# Patient Record
Sex: FEMALE | Race: WHITE
[De-identification: names, ages, dates, MRNs, and addresses within clinical notes are randomized per-mention and may not be internally consistent; named-entity substitution may affect disease eponyms.]

---

## 2017-02-02 ENCOUNTER — HOSPITAL ENCOUNTER (EMERGENCY)
Dept: HOSPITAL 17 - NETRI | Age: 22
Discharge: HOME | End: 2017-02-02
Payer: MEDICARE

## 2017-02-02 VITALS
SYSTOLIC BLOOD PRESSURE: 136 MMHG | DIASTOLIC BLOOD PRESSURE: 69 MMHG | TEMPERATURE: 98.2 F | OXYGEN SATURATION: 99 % | HEART RATE: 86 BPM | RESPIRATION RATE: 16 BRPM

## 2017-02-02 VITALS — HEIGHT: 62 IN | BODY MASS INDEX: 30.43 KG/M2 | WEIGHT: 165.35 LBS

## 2017-02-02 DIAGNOSIS — Z32.02: ICD-10-CM

## 2017-02-02 DIAGNOSIS — N91.2: Primary | ICD-10-CM

## 2017-02-02 LAB
BACTERIA #/AREA URNS HPF: (no result) /HPF
BETA HCG QUANT: (no result) MIU/ML (ref 0–5)
COLOR UR: YELLOW
COMMENT (UR): (no result)
CULTURE IF INDICATED: (no result)
GLUCOSE UR STRIP-MCNC: (no result) MG/DL
HGB UR QL STRIP: (no result)
KETONES UR STRIP-MCNC: (no result) MG/DL
MUCOUS THREADS #/AREA URNS LPF: (no result) /LPF
NITRITE UR QL STRIP: (no result)
SP GR UR STRIP: 1.02 (ref 1–1.03)
SQUAMOUS #/AREA URNS HPF: 2 /HPF (ref 0–5)

## 2017-02-02 PROCEDURE — 99283 EMERGENCY DEPT VISIT LOW MDM: CPT

## 2017-02-02 PROCEDURE — 81001 URINALYSIS AUTO W/SCOPE: CPT

## 2017-02-02 PROCEDURE — 84703 CHORIONIC GONADOTROPIN ASSAY: CPT

## 2017-02-02 PROCEDURE — 84702 CHORIONIC GONADOTROPIN TEST: CPT

## 2017-02-02 NOTE — PD
HPI


Chief Complaint:  Gyn Problem/Complaint


Time Seen by Provider:  13:15


Travel History


International Travel<30 days:  No


Contact w/Intl Traveler<30days:  No


Traveled to known affect area:  No





History of Present Illness


HPI


21-year-old  female presents to the ED for evaluation of a 2 month history 

of amenorrhea.  She states that she has taken multiple urine pregnancy test 

which are negative but she is concerned that she may be pregnant.  She endorses 

2 days of light brown spotting last month.  She endorses intermittent abdominal 

discomfort.  No alleviating or exacerbating factors reported.  She states that 

prior to these episodes her periods lasted 7 days, heavier flow the first 3 

days tapering off at the end.  She denies fever, chills, anorexia, nausea, 

vomiting, changes in bowel habits, dysuria, back pain, vaginal discharge, 

vaginal odor.  She endorses unprotected sex with a single male partner.  She 

does not use oral contraception.





PFSH


Past Medical History


Diminished Hearing:  No


Pregnant?:  Unknown


LMP:  2016


:  2


Para:  0


Miscarriage:  1





Social History


Alcohol Use:  No


Tobacco Use:  No


Substance Use:  Yes (MARIJUANA BEFORE PREGNANCY)





Allergies-Medications


(Allergen,Severity, Reaction):  


Coded Allergies:  


     No Known Allergies (Unverified , 1/9/15)


Reported Meds & Prescriptions





Reported Meds & Active Scripts


Active


Keflex (Cephalexin) 500 Mg Cap 500 Mg PO Q6H 7 Days


Z.0.jasmin-Colace 8.61 1 Tab Tab 1 Tab PO DAILY PRN


Z.0.mominuzws252 Mg 800 Mg Tab 800 Mg PO Q8 PRN


Reported


Prenatal Vitamins   1 Tab PO DAILY








Review of Systems


Except as stated in HPI:  all other systems reviewed are Neg





Physical Exam


Narrative


GENERAL: Well-nourished, well-developed nontoxic appearing white female in no 

acute distress..


SKIN: Warm and dry.


HEAD: Normocephalic.


EYES: No scleral icterus. No injection or drainage. 


NECK: Supple, trachea midline. No JVD or lymphadenopathy.


CARDIOVASCULAR: Regular rate and rhythm without murmurs, gallops, or rubs.  2+ 

DP and radial pulses bilaterally.


RESPIRATORY: Breath sounds clear and equal bilaterally. No accessory muscle use.


GASTROINTESTINAL: Abdomen soft, non-tender, nondistended.  No suprapubic 

tenderness.  Active bowel sounds.


MUSCULOSKELETAL: No cyanosis, or edema.  The patient is ambulatory.  She moves 

easily from standing to sitting positions.


BACK: Nontender without obvious deformity. No CVA tenderness.





Data


Data


Last Documented VS





Vital Signs








  Date Time  Temp Pulse Resp B/P Pulse Ox O2 Delivery O2 Flow Rate FiO2


 


17 11:23 98.2 86 16 136/69 99 Room Air  








Orders





 Ed Urine Pregnancytest Poc (17 11:35)


Urinalysis - C+S If Indicated (17 13:23)


Beta Hcg (Quant/Titer) (17 13:23)





Labs





 Laboratory Tests








Test 17





 11:45 13:30


 


Urine Color YELLOW  


 


Urine Turbidity CLEAR  


 


Urine pH 6.5  


 


Urine Specific Gravity 1.024  


 


Urine Protein NEG mg/dL 


 


Urine Glucose (UA) NEG mg/dL 


 


Urine Ketones NEG mg/dL 


 


Urine Occult Blood NEG  


 


Urine Nitrite NEG  


 


Urine Bilirubin NEG  


 


Urine Urobilinogen LESS THAN 2.0 





 MG/DL 


 


Urine Leukocyte Esterase NEG  


 


Urine RBC 2 /hpf 


 


Urine WBC 1 /hpf 


 


Urine Squamous Epithelial 2 /hpf 





Cells  


 


Urine Bacteria RARE /hpf 


 


Urine Mucus FEW /lpf 


 


Microscopic Urinalysis Comment CULT NOT 





 INDICATED 


 


Human Chorionic Gonadotropin,  LESS THAN 1





Quant  MIU/ML











MDM


Medical Decision Making


Medical Screen Exam Complete:  Yes


Emergency Medical Condition:  Yes


Differential Diagnosis


Dysfunctional uterine bleeding versus amenorrhea versus pregnancy versus 

metromenorrhagia versus other


Narrative Course


21-year-old  female presents to the ED for evaluation of 2 months history 

of absent menses.  She endorses multiple negative urine pregnancy tests but is 

still concerned that she is pregnant.  Endorses 2 days of light brown spotting 

last month.  Endorses intermittent abdominal discomfort, no alleviating or 

exacerbating factors reported.  Denies fever, chills, anorexia, nausea, vomiting

, changes in bowel habit, dysuria, back pain, vaginal discharge, vaginal odor.  

Endorses unprotected sex with a single male partner.  Vitals reviewed.  

Physical exam reveals a nontoxic-appearing white female in no acute distress.  

Abdominal exam is completely benign.  Urine pregnancy test negative.  No 

indication for culture of the UA.  Beta Quant hCG less than 1.  I discussed the 

results of blood workup with the patient.  She was reassured but the blood work 

that she is not pregnant.  I stressed the importance of follow-up with the 

gynecologist for further evaluation of her absent periods.  Instructed her to 

establish care within an OB as soon as possible.  We discussed reasons to 

return to the ED.  She indicated understanding of the instructions.  She is 

amenable to plan of care.  She is stable and discharged home.





Diagnosis





 Primary Impression:  


 Amenorrhea, unspecified


Referrals:  


Gynecologist


Patient Instructions:  Amenorrhea (GEN), General Instructions





***Additional Instructions:


Rest, hydrate.


Use barrier birth control or oral contraceptive methods.


Follow-up with the gynecologist for full evaluation of your absent periods.


Return to the ED for any urgent or emergent medical condition.


Disposition:  01 DISCHARGE HOME


Condition:  Stable








Delisa Staples 2017 13:16

## 2017-10-03 ENCOUNTER — HOSPITAL ENCOUNTER (EMERGENCY)
Dept: HOSPITAL 17 - NEPD | Age: 22
LOS: 1 days | Discharge: HOME | End: 2017-10-04
Payer: MEDICARE

## 2017-10-03 VITALS
RESPIRATION RATE: 16 BRPM | OXYGEN SATURATION: 100 % | SYSTOLIC BLOOD PRESSURE: 130 MMHG | DIASTOLIC BLOOD PRESSURE: 73 MMHG | HEART RATE: 101 BPM | TEMPERATURE: 98.7 F

## 2017-10-03 VITALS — WEIGHT: 176.37 LBS | BODY MASS INDEX: 32.46 KG/M2 | HEIGHT: 62 IN

## 2017-10-03 DIAGNOSIS — R10.11: ICD-10-CM

## 2017-10-03 DIAGNOSIS — O26.892: Primary | ICD-10-CM

## 2017-10-03 DIAGNOSIS — Z3A.15: ICD-10-CM

## 2017-10-03 PROCEDURE — 83690 ASSAY OF LIPASE: CPT

## 2017-10-03 PROCEDURE — 99285 EMERGENCY DEPT VISIT HI MDM: CPT

## 2017-10-03 PROCEDURE — 76705 ECHO EXAM OF ABDOMEN: CPT

## 2017-10-03 PROCEDURE — 76775 US EXAM ABDO BACK WALL LIM: CPT

## 2017-10-03 PROCEDURE — 85730 THROMBOPLASTIN TIME PARTIAL: CPT

## 2017-10-03 PROCEDURE — 85025 COMPLETE CBC W/AUTO DIFF WBC: CPT

## 2017-10-03 PROCEDURE — 81001 URINALYSIS AUTO W/SCOPE: CPT

## 2017-10-03 PROCEDURE — 80053 COMPREHEN METABOLIC PANEL: CPT

## 2017-10-03 PROCEDURE — 85610 PROTHROMBIN TIME: CPT

## 2017-10-04 LAB
ALP SERPL-CCNC: 56 U/L (ref 45–117)
ALT SERPL-CCNC: 12 U/L (ref 10–53)
ANION GAP SERPL CALC-SCNC: 8 MEQ/L (ref 5–15)
APTT BLD: 28.9 SEC (ref 24.3–30.1)
AST SERPL-CCNC: 8 U/L (ref 15–37)
BASOPHILS # BLD AUTO: 0 TH/MM3 (ref 0–0.2)
BASOPHILS NFR BLD: 0.5 % (ref 0–2)
BILIRUB SERPL-MCNC: 0.2 MG/DL (ref 0.2–1)
BUN SERPL-MCNC: 8 MG/DL (ref 7–18)
CHLORIDE SERPL-SCNC: 105 MEQ/L (ref 98–107)
COLOR UR: (no result)
COMMENT (UR): (no result)
CULTURE IF INDICATED: (no result)
EOSINOPHIL # BLD: 0.1 TH/MM3 (ref 0–0.4)
EOSINOPHIL NFR BLD: 1.3 % (ref 0–4)
ERYTHROCYTE [DISTWIDTH] IN BLOOD BY AUTOMATED COUNT: 13.4 % (ref 11.6–17.2)
GFR SERPLBLD BASED ON 1.73 SQ M-ARVRAT: 162 ML/MIN (ref 89–?)
GLUCOSE UR STRIP-MCNC: (no result) MG/DL
HCO3 BLD-SCNC: 25 MEQ/L (ref 21–32)
HCT VFR BLD CALC: 32.9 % (ref 35–46)
HEMO FLAGS: (no result)
HGB UR QL STRIP: (no result)
INR PPP: 1 RATIO
KETONES UR STRIP-MCNC: (no result) MG/DL
LYMPHOCYTES # BLD AUTO: 2.5 TH/MM3 (ref 1–4.8)
LYMPHOCYTES NFR BLD AUTO: 28.2 % (ref 9–44)
MCH RBC QN AUTO: 29.2 PG (ref 27–34)
MCHC RBC AUTO-ENTMCNC: 34.4 % (ref 32–36)
MCV RBC AUTO: 84.9 FL (ref 80–100)
MONOCYTES NFR BLD: 6.2 % (ref 0–8)
NEUTROPHILS # BLD AUTO: 5.6 TH/MM3 (ref 1.8–7.7)
NEUTROPHILS NFR BLD AUTO: 63.8 % (ref 16–70)
NITRITE UR QL STRIP: (no result)
PLATELET # BLD: 169 TH/MM3 (ref 150–450)
POTASSIUM SERPL-SCNC: 3.5 MEQ/L (ref 3.5–5.1)
PROTHROMBIN TIME: 10.7 SEC (ref 9.8–11.6)
RBC # BLD AUTO: 3.87 MIL/MM3 (ref 4–5.3)
SODIUM SERPL-SCNC: 138 MEQ/L (ref 136–145)
SP GR UR STRIP: 1.01 (ref 1–1.03)
SQUAMOUS #/AREA URNS HPF: 1 /HPF (ref 0–5)
WBC # BLD AUTO: 8.8 TH/MM3 (ref 4–11)

## 2017-10-04 NOTE — RADRPT
EXAM DATE/TIME:  10/04/2017 00:41 

 

[facility] COMPARISON:     

US KIDNEY/RENAL/BLADDER, October 04, 2017, 0:48.  CT ABDOMEN & PELVIS W/O CONTRAST, November 04, 2016
, 18:33.  US PELVIS ([facility] PREG/ECTOPIC) W/TRANSVAG, July 12, 2014, 4:53.

        

 

 

INDICATIONS :     

Right upper quadrant pain.

                     

 

MEDICAL HISTORY :     

Pregnancy.     Right upper quadrant pain.

 

SURGICAL HISTORY :     

None.     

 

ENCOUNTER:     

Initial

 

ACUITY:     

1 day

 

PAIN SCORE:     

3/10

 

LOCATION:     

Right upper quadrant 

                     

MEASUREMENTS:     

 

LIVER:     

16.1 cm length 

 

COMMON DUCT:     

3 mm

 

RIGHT KIDNEY:     

10.9 x 5.2 x 6.9 cm

 

FINDINGS:     

 

LIVER:     

Normal echotexture without focal lesion or ductal dilatation.  

 

COMMON DUCT:     

No intraluminal mass or stone visualized.  

 

GALLBLADDER:          

Contains no stones, demonstrates no wall thickening or pericholecystic fluid.  

 

PANCREAS:          

The visualized portions are within normal limits.  

 

RIGHT KIDNEY:          

Mild hydronephrosis.

 

CONCLUSION:     

Mild hydronephrosis of the right kidney. Normal ultrasound appearance of the gallbladder and other ri
ght upper quadrant structures.

 

 

 

 Christos Miller MD on October 04, 2017 at 1:36           

Board Certified Radiologist.

 This report was verified electronically.

## 2017-10-04 NOTE — RADRPT
EXAM DATE/TIME:  10/04/2017 00:48 

 

[facility] COMPARISON:     

US ABDOMEN - GALLBLADDER, October 04, 2017, 0:41.  CT ABDOMEN & PELVIS W/O CONTRAST, November 04, 201
6, 18:33.  US PELVIS ([facility] PREG/ECTOPIC) W/TRANSVAG, July 12, 2014, 4:53.

        

 

 

INDICATIONS :     

Flank pain.

                     

 

MEDICAL HISTORY :     

Pregnancy.     Flank pain.

 

SURGICAL HISTORY :     

None.     

 

ENCOUNTER:     

Initial

 

ACUITY:     

1 day

 

PAIN SCORE:     

3/10

 

LOCATION:     

Bilateral flank 

MEASUREMENTS:     

 

RIGHT KIDNEY:     

11.1 x 4.7 x 6.3 cm

 

LEFT KIDNEY:     

11.6 x 5.2 x 5.9 cm

 

FINDINGS:     

 

RIGHT KIDNEY:     

Mild dilatation of the renal pelvis and calyces. No focal renal lesion.

 

LEFT KIDNEY:     

Renal cortex is normal in thickness and echotexture.  No hydronephrosis, stone, or mass.  

 

BLADDER:     

Within normal limits given the degree of distension.  

 

CONCLUSION:     

Very mild hydronephrosis on the right. Normal ultrasound appearance of the left kidney and urinary bl
adder.

 

 

 

 Christos Miller MD on October 04, 2017 at 1:38           

Board Certified Radiologist.

 This report was verified electronically.

## 2017-10-04 NOTE — PD
HPI


Chief Complaint:  Abdominal Pain


Time Seen by Provider:  00:05


Travel History


International Travel<30 days:  No


Contact w/Intl Traveler<30days:  No


Traveled to known affect area:  No





History of Present Illness


HPI


22-year-old female , approximately 15 weeks pregnant, here for evaluation 

of abdominal and right flank pain.  Symptoms started about 3 hours prior to 

arrival.  Pain is described as sharp.  It pain initially started in her mid 

abdomen/periumbilical region.  Pain is now located in her right upper quadrant 

and right flank.  She denies urinary symptoms.  No fevers or chills.  She has 

felt nauseous but has not vomited.  No history of abdominal surgeries.  And is 

5 out of 10, slightly worse with movements.





PFSH


Past Medical History


Medical History:  Denies Significant Hx


Diminished Hearing:  No


Tetanus Vaccination:  < 5 Years


Influenza Vaccination:  Yes


Pregnant?:  Pregnant


LMP:  2017


:  2


Para:  0


Miscarriage:  1





Past Surgical History


Surgical History:  No Previous Surgery





Social History


Alcohol Use:  No


Tobacco Use:  No


Substance Use:  Yes (MARIJUANA BEFORE PREGNANCY)





Allergies-Medications


(Allergen,Severity, Reaction):  


Coded Allergies:  


     No Known Allergies (Unverified , 1/9/15)


Reported Meds & Prescriptions





Reported Meds & Active Scripts


Active


Keflex (Cephalexin) 500 Mg Cap 500 Mg PO Q6H 7 Days


Z.0.jasmin-Colace 8.61 1 Tab Tab 1 Tab PO DAILY PRN


Z.0.zbgegtpea184 Mg 800 Mg Tab 800 Mg PO Q8 PRN


Reported


Prenatal Vitamins   1 Tab PO DAILY








Review of Systems


Except as stated in HPI:  all other systems reviewed are Neg





Physical Exam


Narrative


GENERAL: Well-developed, well-nourished, comfortable, no apparent distress.


SKIN: Focused skin assessment warm/dry.


HEAD: Atraumatic. Normocephalic. 


EYES: Pupils equal and round. No scleral icterus. No injection or drainage. 


ENT: Mucous membranes pink and moist.


CARDIOVASCULAR: Regular rate and rhythm.  


RESPIRATORY: No accessory muscle use. Clear to auscultation. Breath sounds 

equal bilaterally. 


GASTROINTESTINAL: Abdomen soft, nondistended.  Mild periumbilical and right 

upper quadrant tenderness without peritoneal signs.  No right lower quadrant 

tenderness.  No hernias.


MUSCULOSKELETAL: No obvious deformities. No clubbing.  No cyanosis.  No edema. 


NEUROLOGICAL: Awake and alert. No obvious cranial nerve deficits.  Motor 

grossly within normal limits. Normal speech.


PSYCHIATRIC: Appropriate mood and affect; insight and judgment normal.





Data


Data


Last Documented VS





Vital Signs








  Date Time  Temp Pulse Resp B/P (MAP) Pulse Ox O2 Delivery O2 Flow Rate FiO2


 


10/3/17 22:12 98.7 101 16 130/73 (92) 100   








Orders





 Orders


Urinalysis - C+S If Indicated (10/4/17 00:05)


Complete Blood Count With Diff (10/4/17 00:26)


Comprehensive Metabolic Panel (10/4/17 00:26)


Lipase (10/4/17 00:26)


Prothrombin Time / Inr (Pt) (10/4/17 00:26)


Act Partial Throm Time (Ptt) (10/4/17 00:26)


Us Abdomen Gallbladder (10/4/17 )


Iv Access Insert/Monitor (10/4/17 00:26)


Ecg Monitoring (10/4/17 00:26)


Oximetry (10/4/17 00:26)


Sodium Chloride 0.9% Flush (Ns Flush) (10/4/17 00:30)


Us Kidney/Renal/Bladder (10/4/17 )


Acetaminophen (Tylenol) (10/4/17 01:45)





Labs





Laboratory Tests








Test


  10/4/17


00:15 10/4/17


00:45


 


Urine Color LIGHT-YELLOW  


 


Urine Turbidity CLEAR  


 


Urine pH 6.5  


 


Urine Specific Gravity 1.008  


 


Urine Protein NEG mg/dL  


 


Urine Glucose (UA) NEG mg/dL  


 


Urine Ketones NEG mg/dL  


 


Urine Occult Blood NEG  


 


Urine Nitrite NEG  


 


Urine Bilirubin NEG  


 


Urine Urobilinogen


  LESS THAN 2.0


MG/DL 


 


 


Urine Leukocyte Esterase TRACE  


 


Urine RBC


  LESS THAN 1


/hpf 


 


 


Urine WBC 1 /hpf  


 


Urine Squamous Epithelial


Cells 1 /hpf 


  


 


 


Microscopic Urinalysis Comment


  CULT NOT


INDICATED 


 


 


White Blood Count  8.8 TH/MM3 


 


Red Blood Count  3.87 MIL/MM3 


 


Hemoglobin  11.3 GM/DL 


 


Hematocrit  32.9 % 


 


Mean Corpuscular Volume  84.9 FL 


 


Mean Corpuscular Hemoglobin  29.2 PG 


 


Mean Corpuscular Hemoglobin


Concent 


  34.4 % 


 


 


Red Cell Distribution Width  13.4 % 


 


Platelet Count  169 TH/MM3 


 


Mean Platelet Volume  11.4 FL 


 


Neutrophils (%) (Auto)  63.8 % 


 


Lymphocytes (%) (Auto)  28.2 % 


 


Monocytes (%) (Auto)  6.2 % 


 


Eosinophils (%) (Auto)  1.3 % 


 


Basophils (%) (Auto)  0.5 % 


 


Neutrophils # (Auto)  5.6 TH/MM3 


 


Lymphocytes # (Auto)  2.5 TH/MM3 


 


Monocytes # (Auto)  0.5 TH/MM3 


 


Eosinophils # (Auto)  0.1 TH/MM3 


 


Basophils # (Auto)  0.0 TH/MM3 


 


CBC Comment  DIFF FINAL 


 


Differential Comment   


 


Prothrombin Time  10.7 SEC 


 


Prothromb Time International


Ratio 


  1.0 RATIO 


 


 


Activated Partial


Thromboplast Time 


  28.9 SEC 


 


 


Blood Urea Nitrogen  8 MG/DL 


 


Creatinine  0.48 MG/DL 


 


Random Glucose  91 MG/DL 


 


Total Protein  7.1 GM/DL 


 


Albumin  3.1 GM/DL 


 


Calcium Level  8.7 MG/DL 


 


Alkaline Phosphatase  56 U/L 


 


Aspartate Amino Transf


(AST/SGOT) 


  8 U/L 


 


 


Alanine Aminotransferase


(ALT/SGPT) 


  12 U/L 


 


 


Total Bilirubin  0.2 MG/DL 


 


Sodium Level  138 MEQ/L 


 


Potassium Level  3.5 MEQ/L 


 


Chloride Level  105 MEQ/L 


 


Carbon Dioxide Level  25.0 MEQ/L 


 


Anion Gap  8 MEQ/L 


 


Estimat Glomerular Filtration


Rate 


  162 ML/MIN 


 


 


Lipase  110 U/L 











[facility]


Medical Decision Making


Medical Screen Exam Complete:  Yes


Emergency Medical Condition:  Yes


Differential Diagnosis


UTI, pyelonephritis, nephrolithiasis, cholelithiasis, cholecystitis, UTI, round 

ligament pain, acute appendicitis less likely


Narrative Course


Bedside transabdominal ultrasound performed by me shows an IUP with a fetal 

heart rate of 160 bpm.





Initial vital signs show heart rate 101, blood pressure 130/73, pulse ox 100% 

on room air, oral temp of 98.7F.





CBC: WBC 8.8, hemoglobin 11.3, hematocrit 32.9, platelets 169.


CMP is unremarkable.


Lipase is 110.





UA shows trace leukocyte esterase, not suggestive of UTI.





Right upper quadrant ultrasound:


Mild hydronephrosis of the right kidney.  Normal ultrasound appearance of the 

gallbladder and other right upper quadrant structures.





Renal ultrasound:


Very mild hydronephrosis on the right.  Normal ultrasound appearance of the 

left kidney and urinary bladder.





The patient was made aware of all findings per she is resting comfortably.  

There are no peritoneal signs on exam.  No right lower quadrant tenderness, 

rebound, or guarding.  I do not believe she has appendicitis.  I believe her 

pain is more likely secondary to round ligament pain.  She does have some mild 

hydronephrosis on the right which could be compression of the ureter by the 

uterus.  She does not have any hematuria.  I do not believe she has 

ureterolithiasis.  At this point I believe she is stable for discharge home 

with outpatient follow-up with her obstetrician this week.  She was informed on 

when to return to the emergency department.  She verbalizes understanding and 

agreement with plan.





Procedures


**Procedure Narrative**


Bedside transabdominal ultrasound:


Using the curvilinear ultrasound probe, a bedside transabdominal ultrasound was 

performed by me and shows an IUP with a fetal heart rate of 160 bpm.





Diagnosis





 Primary Impression:  


 Abdominal pain affecting pregnancy


Referrals:  


Obstetrician


3 days





***Additional Instructions:  


Follow-up with your OB/GYN physician this week.


Take Tylenol for pain.


Return to the emergency department for worsening symptoms or any other concerns 

as discussed.


Disposition:  01 DISCHARGE HOME


Condition:  Stable











Justo Palmer MD Oct 4, 2017 00:34

## 2018-03-19 ENCOUNTER — HOSPITAL ENCOUNTER (INPATIENT)
Dept: HOSPITAL 17 - HOBED | Age: 23
LOS: 4 days | Discharge: HOME | End: 2018-03-23
Attending: OBSTETRICS & GYNECOLOGY | Admitting: OBSTETRICS & GYNECOLOGY
Payer: MEDICAID

## 2018-03-19 VITALS — HEART RATE: 95 BPM | SYSTOLIC BLOOD PRESSURE: 121 MMHG | DIASTOLIC BLOOD PRESSURE: 76 MMHG

## 2018-03-19 VITALS — WEIGHT: 209.44 LBS | HEIGHT: 62 IN | BODY MASS INDEX: 38.54 KG/M2

## 2018-03-19 VITALS — SYSTOLIC BLOOD PRESSURE: 121 MMHG | DIASTOLIC BLOOD PRESSURE: 81 MMHG | HEART RATE: 99 BPM

## 2018-03-19 VITALS — DIASTOLIC BLOOD PRESSURE: 71 MMHG | HEART RATE: 89 BPM | SYSTOLIC BLOOD PRESSURE: 119 MMHG

## 2018-03-19 VITALS — HEART RATE: 98 BPM | SYSTOLIC BLOOD PRESSURE: 142 MMHG | DIASTOLIC BLOOD PRESSURE: 92 MMHG

## 2018-03-19 VITALS — TEMPERATURE: 98.7 F | RESPIRATION RATE: 18 BRPM

## 2018-03-19 VITALS — SYSTOLIC BLOOD PRESSURE: 132 MMHG | DIASTOLIC BLOOD PRESSURE: 79 MMHG | HEART RATE: 95 BPM

## 2018-03-19 VITALS — HEART RATE: 126 BPM | DIASTOLIC BLOOD PRESSURE: 76 MMHG | SYSTOLIC BLOOD PRESSURE: 128 MMHG

## 2018-03-19 VITALS — DIASTOLIC BLOOD PRESSURE: 83 MMHG | SYSTOLIC BLOOD PRESSURE: 126 MMHG | HEART RATE: 94 BPM

## 2018-03-19 VITALS — SYSTOLIC BLOOD PRESSURE: 134 MMHG | HEART RATE: 100 BPM | DIASTOLIC BLOOD PRESSURE: 83 MMHG

## 2018-03-19 VITALS — DIASTOLIC BLOOD PRESSURE: 75 MMHG | HEART RATE: 105 BPM | SYSTOLIC BLOOD PRESSURE: 129 MMHG

## 2018-03-19 DIAGNOSIS — Z3A.39: ICD-10-CM

## 2018-03-19 DIAGNOSIS — R33.9: ICD-10-CM

## 2018-03-19 DIAGNOSIS — O99.89: ICD-10-CM

## 2018-03-19 DIAGNOSIS — E66.9: ICD-10-CM

## 2018-03-19 LAB
ALBUMIN SERPL-MCNC: 2.5 GM/DL (ref 3.4–5)
ALP SERPL-CCNC: 249 U/L (ref 45–117)
ALT SERPL-CCNC: 14 U/L (ref 10–53)
AST SERPL-CCNC: 16 U/L (ref 15–37)
BACTERIA #/AREA URNS HPF: (no result) /HPF
BILIRUB SERPL-MCNC: 0.2 MG/DL (ref 0.2–1)
BUN SERPL-MCNC: 10 MG/DL (ref 7–18)
CALCIUM SERPL-MCNC: 8.8 MG/DL (ref 8.5–10.1)
CHLORIDE SERPL-SCNC: 107 MEQ/L (ref 98–107)
COLOR UR: YELLOW
CREAT SERPL-MCNC: 0.61 MG/DL (ref 0.5–1)
ERYTHROCYTE [DISTWIDTH] IN BLOOD BY AUTOMATED COUNT: 15 % (ref 11.6–17.2)
GFR SERPLBLD BASED ON 1.73 SQ M-ARVRAT: 123 ML/MIN (ref 89–?)
GLUCOSE SERPL-MCNC: 78 MG/DL (ref 74–106)
GLUCOSE UR STRIP-MCNC: (no result) MG/DL
HCO3 BLD-SCNC: 23.2 MEQ/L (ref 21–32)
HCT VFR BLD CALC: 34.4 % (ref 35–46)
HGB BLD-MCNC: 11 GM/DL (ref 11.6–15.3)
HGB UR QL STRIP: (no result)
KETONES UR STRIP-MCNC: (no result) MG/DL
MCH RBC QN AUTO: 25.1 PG (ref 27–34)
MCHC RBC AUTO-ENTMCNC: 32 % (ref 32–36)
MCV RBC AUTO: 78.5 FL (ref 80–100)
MUCOUS THREADS #/AREA URNS LPF: (no result) /LPF
NITRITE UR QL STRIP: (no result)
PLATELET # BLD: 124 TH/MM3 (ref 150–450)
PMV BLD AUTO: 10.9 FL (ref 7–11)
PROT SERPL-MCNC: 7 GM/DL (ref 6.4–8.2)
RBC # BLD AUTO: 4.39 MIL/MM3 (ref 4–5.3)
SODIUM SERPL-SCNC: 140 MEQ/L (ref 136–145)
SP GR UR STRIP: 1.01 (ref 1–1.03)
SQUAMOUS #/AREA URNS HPF: 18 /HPF (ref 0–5)
URINE LEUKOCYTE ESTERASE: (no result)
WBC # BLD AUTO: 6.2 TH/MM3 (ref 4–11)

## 2018-03-19 PROCEDURE — 84550 ASSAY OF BLOOD/URIC ACID: CPT

## 2018-03-19 PROCEDURE — 85027 COMPLETE CBC AUTOMATED: CPT

## 2018-03-19 PROCEDURE — G0481 DRUG TEST DEF 8-14 CLASSES: HCPCS

## 2018-03-19 PROCEDURE — 80307 DRUG TEST PRSMV CHEM ANLYZR: CPT

## 2018-03-19 PROCEDURE — 86901 BLOOD TYPING SEROLOGIC RH(D): CPT

## 2018-03-19 PROCEDURE — 82805 BLOOD GASES W/O2 SATURATION: CPT

## 2018-03-19 PROCEDURE — 90715 TDAP VACCINE 7 YRS/> IM: CPT

## 2018-03-19 PROCEDURE — 3E0P7VZ INTRODUCTION OF HORMONE INTO FEMALE REPRODUCTIVE, VIA NATURAL OR ARTIFICIAL OPENING: ICD-10-PCS | Performed by: OBSTETRICS & GYNECOLOGY

## 2018-03-19 PROCEDURE — 80053 COMPREHEN METABOLIC PANEL: CPT

## 2018-03-19 PROCEDURE — 84156 ASSAY OF PROTEIN URINE: CPT

## 2018-03-19 PROCEDURE — 82570 ASSAY OF URINE CREATININE: CPT

## 2018-03-19 PROCEDURE — 81001 URINALYSIS AUTO W/SCOPE: CPT

## 2018-03-19 PROCEDURE — 86850 RBC ANTIBODY SCREEN: CPT

## 2018-03-19 PROCEDURE — 85025 COMPLETE CBC W/AUTO DIFF WBC: CPT

## 2018-03-19 PROCEDURE — 86900 BLOOD TYPING SEROLOGIC ABO: CPT

## 2018-03-19 RX ADMIN — OXYTOCIN SCH MLS/HR: 10 INJECTION, SOLUTION INTRAMUSCULAR; INTRAVENOUS at 18:35

## 2018-03-19 RX ADMIN — Medication SCH ML: at 21:00

## 2018-03-19 RX ADMIN — MISOPROSTOL SCH MCG: 100 TABLET ORAL at 21:30

## 2018-03-19 NOTE — HHI.HP
History & Physical


H&P


HPI


Patient is a 22-year-old  at 39/4 weeks gestation that presents to the 

[facility] ED with a chief complaint of elevated blood pressure.  She was sent 

here from [facility] at [facility] for blood pressure of 134/103.  

Patient also reports swelling in her feet bilaterally and her right hand of one 

week duration.  Patient states that she has difficulty grasping things and has 

pain in her legs that keeps her up at night.  She also reports headache, blurry 

vision of 1-1/2 weeks duration, and cramping chest pain at night 1 week that 

also keeps her up at night.  She denies shortness of breath, nausea, vomiting, 

right upper quadrant pain, loss of fluid, bleeding, vaginal discharge.  She had 

no issues with blood pressure in her last pregnancy.


Weeks Gestation:  39


Para:  1


 (Rachel Victor MD)





 History (Limited) 


History


Past Medical History


Medical History:  Denies Significant Hx


 (Rachel Victor MD)





Obstetric History


Obstetric History





First pregnancy vaginal delivery at [facility], full-term with no 

complications


 (Rachel Victor MD)





Past Surgical History


Surgical History:  No Previous Surgery


 (Rachel Victor MD)





Family History


*** Narrative Family History


No family history of hypertension, diabetes, or asthma.


 (Rachel Victor MD)





Social History


Alcohol Use:  No


Tobacco Use:  No


Substance Abuse:  No


 (Rachel Victor MD)





 Allergies-Medications 


Allergies-Medications


(Allergen,Severity, Reaction):  


Coded Allergies:  


     No Known Allergies (Unverified , 1/9/15)


Home Meds


Active Scripts


Cephalexin (Keflex) 500 Mg Cap, 500 MG PO Q6H for Infection for 7 Days, CAP 0 

Refills


   Prov:Faustina Hand MD         16


Sennosides-Docusate Sodium (Pepper-Colace 8.6-50 mg) 1 Tab Tab, 1 TAB PO DAILY Y 

for CONSTIPATION, #30 0 Refills


   Prov:Dara Estrella MD         2/28/15


Ibuprofen (Ibuprofen) 800 Mg Tab, 800 MG PO Q8 Y for PAIN SCALE 5 TO 10, #30 

TAB 0 Refills


   Prov:Dara Estrella MD         2/28/15


Reported Medications


Multivit/Min/Fol Ac/Iron/Pren (Prenatal Vit (Prenatal Plus))  Tab, 1 TAB PO 

DAILY, TAB


   14





 ROS 


Review of Systems


General / Constitutional:  No: Fever, Chills


Eyes:  Blurred Vision


HENT:  Headaches


Cardiovascular:  Chest Pain or Discomfort


Respiratory:  No: Short of Breath


Gastrointestinal:  No: Nausea, Vomiting, Abdominal Pain


Genitourinary:  No: Dysuria


Musculoskeletal:  Weakness


Skin:  No Rash, No Itching


Neurologic:  No: Dizziness (EkoRachel MD)





 Physical Exam 


Physical Exam





Vital Signs








  Date Time  Temp Pulse Resp B/P (MAP) Pulse Ox O2 Delivery O2 Flow Rate FiO2


 


3/19/18 15:54  126  128/76 (93)    








Narrative


GENERAL: Well-nourished, well-developed patient.


SKIN: Warm and dry.


HEAD: Normocephalic and atraumatic.


EYES: No scleral icterus. No injection or drainage. 


ENT: No nasal drainage noted. Mucous membranes pink. Airway patent.


NECK: Supple, trachea midline. No JVD.


CARDIOVASCULAR: Regular rate and rhythm without murmurs, gallops, or rubs. 


RESPIRATORY: Breath sounds equal bilaterally. No accessory muscle use.


ABDOMEN/GI: Abdomen soft, non-tender, bowel sounds present, no rebound, no 

guarding 


   Gravid to 39 weeks size


   Fundal Height: [-]


GENITOURINARY: 


   External Genitalia: intact and normal in appearance


   Cervix: [-]


   Dilatation: [-]          


   Effacement: [-]          


   Station: [-]  


   Presentation: [-]        


   Membranes: [intact or ruptured]


   Uterine Contractions: None, some irritability


FHT's: 


   Category: I   


   Baseline: 130  


   Reactive: multiple accels present  


   Variability: Moderate 


   Decels: None  


EXTREMITIES: No cyanosis or edema.


BACK: Nontender without obvious deformity. No CVA tenderness.


NEUROLOGICAL: Awake and alert. Motor and sensory grossly within normal limits. 

Five out of 5 muscle strength in all muscle groups. Normal speech.


 (EkoRachel MD)





 Data 


Vital Signs Reviewed:  Yes


Orders





 Orders


Vital Signs (Adult) .ON ADMISSION (3/19/18 16:13)


^ Labor Status (3/19/18 16:13)


Urinalysis - C+S If Indicated (3/19/18 16:13)


^ Non Stress Test (3/19/18 16:13)


^ Hydration (3/19/18 16:13)


Cbc No Diff, Includes Plts (3/19/18 16:29)


Comprehensive Metabolic Panel (3/19/18 16:29)


Uric Acid (3/19/18 16:29)


Protein Creat Ratio, Random Ur (3/19/18 16:29)


Group B Strep:  Negative


 (Rachel Victor MD)





 MDM 


MDM


Medical Record Reviewed:  Yes


Interpretation(s)


22-year-old female presents with symptoms suggestive of pregnancy-induced 

hypertension


Plan


1. IUP


-Fetal heart tones category 1, reassuring


-Continue routine  care





2.  Elevated blood pressure


-Has been normotensive in the ED


-We will check UA, CBC, CMP, uric acid, urine/protein ratio


 (Rachel Victor MD)





 Attestation 


Collaborating MD Comments





 Laboratory Tests








Test


  3/19/18


16:50


 


White Blood Count 6.2 TH/MM3 


 


Red Blood Count 4.39 MIL/MM3 


 


Hemoglobin 11.0 GM/DL 


 


Hematocrit 34.4 % 


 


Mean Corpuscular Volume 78.5 FL 


 


Mean Corpuscular Hemoglobin 25.1 PG 


 


Mean Corpuscular Hemoglobin


Concent 32.0 % 


 


 


Red Cell Distribution Width 15.0 % 


 


Platelet Count 124 TH/MM3 


 


Mean Platelet Volume 10.9 FL 


 


Urine Color YELLOW 


 


Urine Turbidity HAZY 


 


Urine pH 6.5 


 


Urine Specific Gravity 1.014 


 


Urine Protein NEG mg/dL 


 


Urine Glucose (UA) NEG mg/dL 


 


Urine Ketones NEG mg/dL 


 


Urine Occult Blood NEG 


 


Urine Nitrite NEG 


 


Urine Bilirubin NEG 


 


Urine Urobilinogen


  LESS THAN 2.0


MG/DL


 


Urine Leukocyte Esterase TRACE 


 


Urine RBC 1 /hpf 


 


Urine WBC 3 /hpf 


 


Urine Squamous Epithelial


Cells 18 /hpf 


 


 


Urine Bacteria RARE /hpf 


 


Urine Mucus FEW /lpf 


 


Microscopic Urinalysis Comment


  CULT NOT


INDICATED


 


Urine Random Creatinine 73 MG/DL 


 


Urine Random Total Protein 16 MG/DL 


 


Urine Protein/Creatinine Ratio 0.22 


 


Blood Urea Nitrogen 10 MG/DL 


 


Creatinine 0.61 MG/DL 


 


Random Glucose 78 MG/DL 


 


Total Protein 7.0 GM/DL 


 


Albumin 2.5 GM/DL 


 


Calcium Level 8.8 MG/DL 


 


Uric Acid 6.8 MG/DL 


 


Alkaline Phosphatase 249 U/L 


 


Aspartate Amino Transf


(AST/SGOT) 16 U/L 


 


 


Alanine Aminotransferase


(ALT/SGPT) 14 U/L 


 


 


Total Bilirubin 0.2 MG/DL 


 


Sodium Level 140 MEQ/L 


 


Potassium Level 4.2 MEQ/L 


 


Chloride Level 107 MEQ/L 


 


Carbon Dioxide Level 23.2 MEQ/L 


 


Anion Gap 10 MEQ/L 


 


Estimat Glomerular Filtration


Rate 123 ML/MIN 


 








 at [id number] elevated blood pressure in office. See note above


Labs documented with slightly low platelet level, elevated uric acid, normal 

transaminases


Cervix 1-2/50/-3, vertex, posterior


Plan cytotec tonight with induction in the am.











Gretchen Pearson MD Mar 19, 2018 18:42

## 2018-03-19 NOTE — PD
HPI


Chief Complaint


High blood pressure


Date Seen:  Mar 19, 2018


 (Rachel Victor MD)





Travel History


International Travel<30 Days:  No


Contact w/Intl Traveler<30Days:  No


Known Affected Area:  No


 (Rachel Victor MD)





History of Present Illness


HPI


Patient is a 22-year-old  at 39/4 weeks gestation that presents to the 

[facility] ED with a chief complaint of elevated blood pressure.  She was sent 

here from [facility]'s at [facility] for blood pressure of 134/103.  

Patient also reports swelling in her feet bilaterally and her right hand of one 

week duration.  Patient states that she has difficulty grasping things and has 

pain in her legs that keeps her up at night.  She also reports headache, blurry 

vision of 1-1/2 weeks duration, and cramping chest pain at night 1 week that 

also keeps her up at night.  She denies shortness of breath, nausea, vomiting, 

right upper quadrant pain, loss of fluid, bleeding, vaginal discharge.  She had 

no issues with blood pressure in her last pregnancy.


Weeks Gestation:  39


Para:  1


 (Rachel Victor MD)





History


Past Medical History


Medical History:  Denies Significant Hx


 (Rachel Victor MD)





Obstetric History


Obstetric History





First pregnancy vaginal delivery at [facility], full-term with no 

complications


 (Rachel Victor MD)





Past Surgical History


Surgical History:  No Previous Surgery


 (Rachel Victor MD)





Family History


*** Narrative Family History


No family history of hypertension, diabetes, or asthma.


 (Rachel Victor MD)





Social History


Alcohol Use:  No


Tobacco Use:  No


Substance Abuse:  No


 (Rachel Victor MD)





Allergies-Medications


(Allergen,Severity, Reaction):  


Coded Allergies:  


     No Known Allergies (Unverified , 1/9/15)


Home Meds


Active Scripts


Cephalexin (Keflex) 500 Mg Cap, 500 MG PO Q6H for Infection for 7 Days, CAP 0 

Refills


   Prov:Faustina Hand MD         16


Sennosides-Docusate Sodium (Pepper-Colace 8.6-50 mg) 1 Tab Tab, 1 TAB PO DAILY Y 

for CONSTIPATION, #30 0 Refills


   Prov:Dara Estrella MD         2/28/15


Ibuprofen (Ibuprofen) 800 Mg Tab, 800 MG PO Q8 Y for PAIN SCALE 5 TO 10, #30 

TAB 0 Refills


   Prov:Dara Estrella MD         2/28/15


Reported Medications


Multivit/Min/Fol Ac/Iron/Pren (Prenatal Vit (Prenatal Plus))  Tab, 1 TAB PO 

DAILY, TAB


   14





Review of Systems


General / Constitutional:  No: Fever, Chills


Eyes:  Blurred Vision


HENT:  Headaches


Cardiovascular:  Chest Pain or Discomfort


Respiratory:  No: Short of Breath


Gastrointestinal:  No: Nausea, Vomiting, Abdominal Pain


Genitourinary:  No: Dysuria


Musculoskeletal:  Weakness


Skin:  No Rash, No Itching


Neurologic:  No: Dizziness (EkoRachel MD)





Physical Exam





Vital Signs








  Date Time  Temp Pulse Resp B/P (MAP) Pulse Ox O2 Delivery O2 Flow Rate FiO2


 


3/19/18 15:54  126  128/76 (93)    








Narrative


GENERAL: Well-nourished, well-developed patient.


SKIN: Warm and dry.


HEAD: Normocephalic and atraumatic.


EYES: No scleral icterus. No injection or drainage. 


ENT: No nasal drainage noted. Mucous membranes pink. Airway patent.


NECK: Supple, trachea midline. No JVD.


CARDIOVASCULAR: Regular rate and rhythm without murmurs, gallops, or rubs. 


RESPIRATORY: Breath sounds equal bilaterally. No accessory muscle use.


ABDOMEN/GI: Abdomen soft, non-tender, bowel sounds present, no rebound, no 

guarding 


   Gravid to 39 weeks size


   Fundal Height: [-]


GENITOURINARY: 


   External Genitalia: intact and normal in appearance


   Cervix: [-]


   Dilatation: [-]          


   Effacement: [-]          


   Station: [-]  


   Presentation: [-]        


   Membranes: [intact or ruptured]


   Uterine Contractions: None, some irritability


FHT's: 


   Category: I   


   Baseline: 130  


   Reactive: multiple accels present  


   Variability: Moderate 


   Decels: None  


EXTREMITIES: No cyanosis or edema.


BACK: Nontender without obvious deformity. No CVA tenderness.


NEUROLOGICAL: Awake and alert. Motor and sensory grossly within normal limits. 

Five out of 5 muscle strength in all muscle groups. Normal speech.


 (Rachel Victor MD)





Data


Data


Vital Signs Reviewed:  Yes


Orders





 Orders


Vital Signs (Adult) .ON ADMISSION (3/19/18 16:13)


^ Labor Status (3/19/18 16:13)


Urinalysis - C+S If Indicated (3/19/18 16:13)


^ Non Stress Test (3/19/18 16:13)


^ Hydration (3/19/18 16:13)


Cbc No Diff, Includes Plts (3/19/18 16:29)


Comprehensive Metabolic Panel (3/19/18 16:29)


Uric Acid (3/19/18 16:29)


Protein Creat Ratio, Random Ur (3/19/18 16:29)


Group B Strep:  Negative


 (EkoRachel MD)





[facility]


Medical Record Reviewed:  Yes


Interpretation(s)


22-year-old female presents with symptoms suggestive of pregnancy-induced 

hypertension


Plan


1. IUP


-Fetal heart tones category 1, reassuring


-Continue routine  care





2.  Elevated blood pressure


-Has been normotensive in the ED


-We will check UA, CBC, CMP, uric acid, urine/protein ratio


 (Rachel Victor MD)





Collaborating MD Comments





 Laboratory Tests








Test


  3/19/18


16:50


 


White Blood Count 6.2 TH/MM3 


 


Red Blood Count 4.39 MIL/MM3 


 


Hemoglobin 11.0 GM/DL 


 


Hematocrit 34.4 % 


 


Mean Corpuscular Volume 78.5 FL 


 


Mean Corpuscular Hemoglobin 25.1 PG 


 


Mean Corpuscular Hemoglobin


Concent 32.0 % 


 


 


Red Cell Distribution Width 15.0 % 


 


Platelet Count 124 TH/MM3 


 


Mean Platelet Volume 10.9 FL 


 


Urine Color YELLOW 


 


Urine Turbidity HAZY 


 


Urine pH 6.5 


 


Urine Specific Gravity 1.014 


 


Urine Protein NEG mg/dL 


 


Urine Glucose (UA) NEG mg/dL 


 


Urine Ketones NEG mg/dL 


 


Urine Occult Blood NEG 


 


Urine Nitrite NEG 


 


Urine Bilirubin NEG 


 


Urine Urobilinogen


  LESS THAN 2.0


MG/DL


 


Urine Leukocyte Esterase TRACE 


 


Urine RBC 1 /hpf 


 


Urine WBC 3 /hpf 


 


Urine Squamous Epithelial


Cells 18 /hpf 


 


 


Urine Bacteria RARE /hpf 


 


Urine Mucus FEW /lpf 


 


Microscopic Urinalysis Comment


  CULT NOT


INDICATED


 


Urine Random Creatinine 73 MG/DL 


 


Urine Random Total Protein 16 MG/DL 


 


Urine Protein/Creatinine Ratio 0.22 


 


Blood Urea Nitrogen 10 MG/DL 


 


Creatinine 0.61 MG/DL 


 


Random Glucose 78 MG/DL 


 


Total Protein 7.0 GM/DL 


 


Albumin 2.5 GM/DL 


 


Calcium Level 8.8 MG/DL 


 


Uric Acid 6.8 MG/DL 


 


Alkaline Phosphatase 249 U/L 


 


Aspartate Amino Transf


(AST/SGOT) 16 U/L 


 


 


Alanine Aminotransferase


(ALT/SGPT) 14 U/L 


 


 


Total Bilirubin 0.2 MG/DL 


 


Sodium Level 140 MEQ/L 


 


Potassium Level 4.2 MEQ/L 


 


Chloride Level 107 MEQ/L 


 


Carbon Dioxide Level 23.2 MEQ/L 


 


Anion Gap 10 MEQ/L 


 


Estimat Glomerular Filtration


Rate 123 ML/MIN 


 








 at 08m1asfv elevated blood pressure in office. See note above


Labs documented with slightly low platelet level, elevated uric acid, normal 

transaminases


Cervix 1-2/50/-3, vertex, posterior


Plan cytotec tonight with induction in the am.


 (Gretchen Pearson MD)











Rachel Victor MD Mar 19, 2018 16:36


Gretchen Pearson MD Mar 19, 2018 18:35

## 2018-03-20 VITALS
SYSTOLIC BLOOD PRESSURE: 121 MMHG | OXYGEN SATURATION: 100 % | RESPIRATION RATE: 18 BRPM | HEART RATE: 117 BPM | DIASTOLIC BLOOD PRESSURE: 73 MMHG

## 2018-03-20 VITALS — TEMPERATURE: 99.4 F

## 2018-03-20 VITALS — DIASTOLIC BLOOD PRESSURE: 59 MMHG | SYSTOLIC BLOOD PRESSURE: 112 MMHG | HEART RATE: 109 BPM

## 2018-03-20 VITALS
DIASTOLIC BLOOD PRESSURE: 95 MMHG | RESPIRATION RATE: 18 BRPM | TEMPERATURE: 98 F | SYSTOLIC BLOOD PRESSURE: 126 MMHG | HEART RATE: 94 BPM

## 2018-03-20 VITALS — HEART RATE: 114 BPM

## 2018-03-20 VITALS — DIASTOLIC BLOOD PRESSURE: 80 MMHG | HEART RATE: 66 BPM | SYSTOLIC BLOOD PRESSURE: 133 MMHG

## 2018-03-20 VITALS — SYSTOLIC BLOOD PRESSURE: 135 MMHG | DIASTOLIC BLOOD PRESSURE: 81 MMHG | HEART RATE: 78 BPM

## 2018-03-20 VITALS
HEART RATE: 81 BPM | DIASTOLIC BLOOD PRESSURE: 75 MMHG | TEMPERATURE: 98.2 F | OXYGEN SATURATION: 96 % | SYSTOLIC BLOOD PRESSURE: 135 MMHG | RESPIRATION RATE: 18 BRPM

## 2018-03-20 VITALS
TEMPERATURE: 98.7 F | DIASTOLIC BLOOD PRESSURE: 72 MMHG | RESPIRATION RATE: 18 BRPM | OXYGEN SATURATION: 100 % | SYSTOLIC BLOOD PRESSURE: 120 MMHG | HEART RATE: 116 BPM

## 2018-03-20 VITALS — HEART RATE: 124 BPM

## 2018-03-20 VITALS
HEART RATE: 117 BPM | OXYGEN SATURATION: 100 % | SYSTOLIC BLOOD PRESSURE: 110 MMHG | DIASTOLIC BLOOD PRESSURE: 73 MMHG | RESPIRATION RATE: 17 BRPM

## 2018-03-20 VITALS — SYSTOLIC BLOOD PRESSURE: 120 MMHG | DIASTOLIC BLOOD PRESSURE: 73 MMHG | HEART RATE: 98 BPM

## 2018-03-20 VITALS — HEART RATE: 99 BPM | SYSTOLIC BLOOD PRESSURE: 127 MMHG | DIASTOLIC BLOOD PRESSURE: 79 MMHG

## 2018-03-20 VITALS — HEART RATE: 103 BPM | DIASTOLIC BLOOD PRESSURE: 61 MMHG | SYSTOLIC BLOOD PRESSURE: 111 MMHG

## 2018-03-20 VITALS — DIASTOLIC BLOOD PRESSURE: 50 MMHG | HEART RATE: 125 BPM | SYSTOLIC BLOOD PRESSURE: 115 MMHG

## 2018-03-20 VITALS — HEART RATE: 74 BPM | DIASTOLIC BLOOD PRESSURE: 63 MMHG | SYSTOLIC BLOOD PRESSURE: 112 MMHG

## 2018-03-20 VITALS — HEART RATE: 119 BPM | SYSTOLIC BLOOD PRESSURE: 99 MMHG | DIASTOLIC BLOOD PRESSURE: 58 MMHG

## 2018-03-20 VITALS — DIASTOLIC BLOOD PRESSURE: 65 MMHG | HEART RATE: 123 BPM | SYSTOLIC BLOOD PRESSURE: 114 MMHG

## 2018-03-20 VITALS — SYSTOLIC BLOOD PRESSURE: 120 MMHG | HEART RATE: 93 BPM | DIASTOLIC BLOOD PRESSURE: 76 MMHG

## 2018-03-20 VITALS
TEMPERATURE: 98.5 F | HEART RATE: 121 BPM | DIASTOLIC BLOOD PRESSURE: 79 MMHG | RESPIRATION RATE: 18 BRPM | SYSTOLIC BLOOD PRESSURE: 127 MMHG | OXYGEN SATURATION: 100 %

## 2018-03-20 VITALS — HEART RATE: 114 BPM | SYSTOLIC BLOOD PRESSURE: 125 MMHG | DIASTOLIC BLOOD PRESSURE: 81 MMHG

## 2018-03-20 VITALS — HEART RATE: 102 BPM

## 2018-03-20 VITALS — SYSTOLIC BLOOD PRESSURE: 116 MMHG | HEART RATE: 156 BPM | DIASTOLIC BLOOD PRESSURE: 68 MMHG

## 2018-03-20 VITALS — SYSTOLIC BLOOD PRESSURE: 113 MMHG | HEART RATE: 189 BPM | DIASTOLIC BLOOD PRESSURE: 72 MMHG

## 2018-03-20 VITALS — SYSTOLIC BLOOD PRESSURE: 113 MMHG | HEART RATE: 101 BPM | DIASTOLIC BLOOD PRESSURE: 61 MMHG

## 2018-03-20 VITALS — SYSTOLIC BLOOD PRESSURE: 126 MMHG | HEART RATE: 116 BPM | DIASTOLIC BLOOD PRESSURE: 70 MMHG

## 2018-03-20 VITALS — RESPIRATION RATE: 18 BRPM | TEMPERATURE: 99.2 F

## 2018-03-20 VITALS — HEART RATE: 108 BPM

## 2018-03-20 VITALS — DIASTOLIC BLOOD PRESSURE: 80 MMHG | SYSTOLIC BLOOD PRESSURE: 125 MMHG | HEART RATE: 83 BPM

## 2018-03-20 VITALS — HEART RATE: 75 BPM | SYSTOLIC BLOOD PRESSURE: 124 MMHG | DIASTOLIC BLOOD PRESSURE: 82 MMHG

## 2018-03-20 VITALS — HEART RATE: 104 BPM | DIASTOLIC BLOOD PRESSURE: 56 MMHG | SYSTOLIC BLOOD PRESSURE: 104 MMHG

## 2018-03-20 VITALS — SYSTOLIC BLOOD PRESSURE: 120 MMHG | HEART RATE: 89 BPM | DIASTOLIC BLOOD PRESSURE: 72 MMHG

## 2018-03-20 VITALS — HEART RATE: 113 BPM | SYSTOLIC BLOOD PRESSURE: 126 MMHG | DIASTOLIC BLOOD PRESSURE: 73 MMHG

## 2018-03-20 VITALS — DIASTOLIC BLOOD PRESSURE: 54 MMHG | HEART RATE: 101 BPM | SYSTOLIC BLOOD PRESSURE: 105 MMHG

## 2018-03-20 VITALS — SYSTOLIC BLOOD PRESSURE: 120 MMHG | HEART RATE: 115 BPM | DIASTOLIC BLOOD PRESSURE: 76 MMHG

## 2018-03-20 VITALS — SYSTOLIC BLOOD PRESSURE: 109 MMHG | DIASTOLIC BLOOD PRESSURE: 61 MMHG | HEART RATE: 110 BPM

## 2018-03-20 VITALS — RESPIRATION RATE: 18 BRPM

## 2018-03-20 VITALS — HEART RATE: 91 BPM | SYSTOLIC BLOOD PRESSURE: 123 MMHG | DIASTOLIC BLOOD PRESSURE: 81 MMHG

## 2018-03-20 VITALS — SYSTOLIC BLOOD PRESSURE: 133 MMHG | HEART RATE: 85 BPM | DIASTOLIC BLOOD PRESSURE: 85 MMHG

## 2018-03-20 VITALS — TEMPERATURE: 98 F

## 2018-03-20 VITALS — HEART RATE: 116 BPM

## 2018-03-20 VITALS — HEART RATE: 110 BPM

## 2018-03-20 VITALS — HEART RATE: 109 BPM

## 2018-03-20 VITALS — DIASTOLIC BLOOD PRESSURE: 66 MMHG | HEART RATE: 99 BPM | SYSTOLIC BLOOD PRESSURE: 114 MMHG

## 2018-03-20 VITALS — HEART RATE: 117 BPM

## 2018-03-20 VITALS — HEART RATE: 107 BPM

## 2018-03-20 VITALS — HEART RATE: 122 BPM

## 2018-03-20 VITALS — HEART RATE: 73 BPM

## 2018-03-20 PROCEDURE — 3E0E7GC INTRODUCTION OF OTHER THERAPEUTIC SUBSTANCE INTO PRODUCTS OF CONCEPTION, VIA NATURAL OR ARTIFICIAL OPENING: ICD-10-PCS | Performed by: OBSTETRICS & GYNECOLOGY

## 2018-03-20 PROCEDURE — 3E0R3BZ INTRODUCTION OF ANESTHETIC AGENT INTO SPINAL CANAL, PERCUTANEOUS APPROACH: ICD-10-PCS

## 2018-03-20 PROCEDURE — 10H07YZ INSERTION OF OTHER DEVICE INTO PRODUCTS OF CONCEPTION, VIA NATURAL OR ARTIFICIAL OPENING: ICD-10-PCS | Performed by: OBSTETRICS & GYNECOLOGY

## 2018-03-20 PROCEDURE — 00HU33Z INSERTION OF INFUSION DEVICE INTO SPINAL CANAL, PERCUTANEOUS APPROACH: ICD-10-PCS

## 2018-03-20 RX ADMIN — MISOPROSTOL SCH MCG: 100 TABLET ORAL at 04:00

## 2018-03-20 RX ADMIN — OXYTOCIN SCH MLS/HR: 10 INJECTION, SOLUTION INTRAMUSCULAR; INTRAVENOUS at 17:09

## 2018-03-20 RX ADMIN — Medication SCH ML: at 09:00

## 2018-03-20 RX ADMIN — MISOPROSTOL SCH MCG: 100 TABLET ORAL at 08:00

## 2018-03-20 RX ADMIN — IBUPROFEN PRN MG: 600 TABLET, FILM COATED ORAL at 16:12

## 2018-03-20 RX ADMIN — MISOPROSTOL SCH MCG: 100 TABLET ORAL at 00:00

## 2018-03-20 RX ADMIN — OXYTOCIN SCH MLS/HR: 10 INJECTION, SOLUTION INTRAMUSCULAR; INTRAVENOUS at 02:35

## 2018-03-20 NOTE — HHI.PR
OB/GYN Note


Note


Patient was seen and evaluated at 0530.  Category 2 FHR tracing noted with 

moderate variability, accels present, baselin 140.  Variable decelerations with 

contractions to 90 x 1 min.  Terbutaline given with amnioinfusion started with 

improvement.  Patient was 7-8cm











Gretchen Pearson MD Mar 20, 2018 08:13

## 2018-03-20 NOTE — HHI.PR
Subjective


Remarks


Patient is a  at 39.4 being induced for gestational hypertension.  She 

previously had a category 2 tracing.  She was started on oxytocin and now 

appears to have repetitive variable decelerations with the oxytocin.  The 

oxytocin was discontinued, oxygen was placed, and 0.25 mg terbutaline given.  

The variables resolved with terbutaline and resuscitative measures.  The fetal 

status was discussed with the patient including the need to proceed with 

 delivery due to fetal intolerance of labor.  We discussed that the baby

's fetal heart rate tracing is very reassuring when she is not romana and 

after administration terbutaline due to the same.  However, we discussed that 

she needs contractions to be able to deliver vaginally.  We discussed that the 

fetus is not tolerating repetitive contractions at this time.  We discussed the 

need to proceed with a  delivery and the patient is in agreement.  We 

discussed the risks, benefits, and alternatives.  We discussed the risks that 

include but are not limited to pain, infection, bleeding, injury to other 

organs like the bladder/bowel/nerves/vessels, injury to the baby, need for 

repeat operation, need for blood confusion, need for hysterectomy, wound 

infection/breakdown and the possible complications.  Consent had previously 

been signed and all the patient's questions were answered.





Objective





Vital Signs








  Date Time  Temp Pulse Resp B/P (MAP) Pulse Ox O2 Delivery O2 Flow Rate FiO2


 


3/20/18 09:30  125  115/50 (71)    


 


3/20/18 09:10 99.4       


 


3/20/18 09:01   18     


 


3/20/18 09:00  113  126/73 (90)    


 


3/20/18 08:30  116  126/70 (88)    


 


3/20/18 08:01   18     


 


3/20/18 08:00  104  104/56 (72)    


 


3/20/18 07:30  103  111/61 (78)    


 


3/20/18 07:06 99.2  18     


 


3/20/18 07:05  117      


 


3/20/18 07:00  128  113/61 (78)    


 


3/20/18 07:00  101      


 


3/20/18 06:55  124      


 


3/20/18 06:50  102      


 


3/20/18 06:45  109  112/59 (76)    


 


3/20/18 06:45  105      


 


3/20/18 06:40  109      


 


3/20/18 06:35  108      


 


3/20/18 06:30  101      


 


3/20/18 06:30  96  105/54 (71)    


 


3/20/18 06:25  116      


 


3/20/18 06:20  107      


 


3/20/18 06:15  130  109/61 (77)    


 


3/20/18 06:15  110      


 


3/20/18 06:10  114      


 


3/20/18 06:05  116      


 


3/20/18 06:00  122      


 


3/20/18 06:00  119  99/58 (72)    


 


3/20/18 05:45  115      


 


3/20/18 05:45  123  120/76 (91)    


 


3/20/18 05:40  110      


 


3/20/18 05:35  117      


 


3/20/18 05:30  121  125/81 (96)    


 


3/20/18 05:30  114      


 


3/20/18 05:25  122      


 


3/20/18 05:20  108      


 


3/20/18 05:16  98  120/73 (89)    


 


3/20/18 05:15  73      


 


3/20/18 05:05  102      


 


3/20/18 05:00 98.0  18     


 


3/20/18 05:00  94  126/95 (105)    


 


3/20/18 04:55  189      


 


3/20/18 04:55  92      


 


3/20/18 04:55    113/72 (86)    


 


3/20/18 04:50  156      


 


3/20/18 04:50  115  116/68 (84)    


 


3/20/18 04:45  103      


 


3/20/18 04:45  93  120/76 (91)    


 


3/20/18 04:40  107      


 


3/20/18 04:40  99  114/66 (82)    


 


3/20/18 04:35  90      


 


3/20/18 04:35  89  120/72 (88)    


 


3/20/18 04:30  89  123/81 (95)    


 


3/20/18 04:30  91      


 


3/20/18 04:25  85      


 


3/20/18 04:25  75  133/85 (101)    


 


3/20/18 04:20  90  127/79 (95)    


 


3/20/18 04:20  99      


 


3/20/18 04:15  80  124/82 (96)    


 


3/20/18 04:15  75      


 


3/20/18 03:00  66  133/80 (97)    


 


3/20/18 02:15 98.0       


 


3/20/18 02:01  78  135/81 (99)    


 


3/20/18 01:00  83  125/80 (95)    


 


3/20/18 00:01  74  112/63 (79)    


 


3/19/18 23:00  89  119/71 (87)    


 


3/19/18 22:00  105  129/75 (93)    


 


3/19/18 21:33  95  121/76 (91)    


 


3/19/18 20:09 98.7  18     


 


3/19/18 20:01  99  121/81 (94)    


 


3/19/18 18:07  95  132/79 (96)    


 


3/19/18 17:21  94  126/83 (97)    


 


3/19/18 16:44  100  134/83 (100)    


 


3/19/18 16:34  98  142/92 (109)    


 


3/19/18 15:54  126  128/76 (93)    








Result Diagram:  


3/19/18 1650                                                                   

             3/19/18 1650














Ramona Rg MD Mar 20, 2018 10:20

## 2018-03-21 VITALS
OXYGEN SATURATION: 100 % | SYSTOLIC BLOOD PRESSURE: 100 MMHG | DIASTOLIC BLOOD PRESSURE: 58 MMHG | TEMPERATURE: 98 F | HEART RATE: 77 BPM | RESPIRATION RATE: 16 BRPM

## 2018-03-21 VITALS
TEMPERATURE: 98.3 F | RESPIRATION RATE: 18 BRPM | HEART RATE: 74 BPM | DIASTOLIC BLOOD PRESSURE: 53 MMHG | SYSTOLIC BLOOD PRESSURE: 105 MMHG | OXYGEN SATURATION: 96 %

## 2018-03-21 VITALS
RESPIRATION RATE: 20 BRPM | HEART RATE: 111 BPM | DIASTOLIC BLOOD PRESSURE: 89 MMHG | TEMPERATURE: 98.2 F | SYSTOLIC BLOOD PRESSURE: 143 MMHG | OXYGEN SATURATION: 98 %

## 2018-03-21 VITALS — OXYGEN SATURATION: 99 %

## 2018-03-21 VITALS
RESPIRATION RATE: 18 BRPM | DIASTOLIC BLOOD PRESSURE: 64 MMHG | SYSTOLIC BLOOD PRESSURE: 111 MMHG | TEMPERATURE: 98 F | HEART RATE: 86 BPM | OXYGEN SATURATION: 98 %

## 2018-03-21 VITALS
RESPIRATION RATE: 20 BRPM | HEART RATE: 96 BPM | SYSTOLIC BLOOD PRESSURE: 129 MMHG | DIASTOLIC BLOOD PRESSURE: 78 MMHG | TEMPERATURE: 98 F

## 2018-03-21 LAB
BASOPHILS # BLD AUTO: 0 TH/MM3 (ref 0–0.2)
BASOPHILS NFR BLD: 0.5 % (ref 0–2)
EOSINOPHIL # BLD: 0 TH/MM3 (ref 0–0.4)
EOSINOPHIL NFR BLD: 0.3 % (ref 0–4)
ERYTHROCYTE [DISTWIDTH] IN BLOOD BY AUTOMATED COUNT: 15.5 % (ref 11.6–17.2)
HCT VFR BLD CALC: 25.5 % (ref 35–46)
HGB BLD-MCNC: 8.5 GM/DL (ref 11.6–15.3)
LYMPHOCYTES # BLD AUTO: 2.6 TH/MM3 (ref 1–4.8)
LYMPHOCYTES NFR BLD AUTO: 31.3 % (ref 9–44)
MCH RBC QN AUTO: 25.8 PG (ref 27–34)
MCHC RBC AUTO-ENTMCNC: 33.2 % (ref 32–36)
MCV RBC AUTO: 77.8 FL (ref 80–100)
MONOCYTE #: 0.7 TH/MM3 (ref 0–0.9)
MONOCYTES NFR BLD: 8.8 % (ref 0–8)
NEUTROPHILS # BLD AUTO: 5 TH/MM3 (ref 1.8–7.7)
NEUTROPHILS NFR BLD AUTO: 59.1 % (ref 16–70)
PLATELET # BLD: 104 TH/MM3 (ref 150–450)
PMV BLD AUTO: 10.8 FL (ref 7–11)
RBC # BLD AUTO: 3.28 MIL/MM3 (ref 4–5.3)
WBC # BLD AUTO: 8.4 TH/MM3 (ref 4–11)

## 2018-03-21 RX ADMIN — IBUPROFEN PRN MG: 600 TABLET, FILM COATED ORAL at 16:23

## 2018-03-21 RX ADMIN — IBUPROFEN PRN MG: 600 TABLET, FILM COATED ORAL at 23:04

## 2018-03-21 RX ADMIN — OXYTOCIN SCH MLS/HR: 10 INJECTION, SOLUTION INTRAMUSCULAR; INTRAVENOUS at 23:04

## 2018-03-21 RX ADMIN — IBUPROFEN PRN MG: 600 TABLET, FILM COATED ORAL at 10:23

## 2018-03-21 RX ADMIN — OXYCODONE HYDROCHLORIDE AND ACETAMINOPHEN PRN TAB: 5; 325 TABLET ORAL at 23:04

## 2018-03-21 RX ADMIN — IBUPROFEN PRN MG: 600 TABLET, FILM COATED ORAL at 02:55

## 2018-03-21 NOTE — MP
cc:

Ramona Rg MD

****

 

 

DATE OF OPERATION:

2018

 

PREOPERATIVE DIAGNOSIS:

1.  Intrauterine pregnancy at 39 weeks and 5 days.

2.  Failure to tolerate labor.

3.  Gestational hypertension.

4.  Obesity.

 

POSTOPERATIVE DIAGNOSIS.

1.  Intrauterine pregnancy at 39 weeks and 5 days.

2.  Failure to tolerate labor.

3.  Gestational hypertension.

4.  Obesity.

5.  Short umbilical cord.

 

PROCEDURE PERFORMED:

1.  Primary low transverse  section with 2-layer closure, no 

extensions, via Pfannenstiel skin incision.

2.  Retrograde instillation of bladder.

 

FINDINGS:

Viable male infant in the cephalic presentation with a short umbilical

cord, weighing 8 pounds and 5 ounces.  Apgars were 8/9 and cord pH 

7.17.  The patient had grossly normal maternal anatomy with normal 

uterus, ovaries and fallopian tubes.  The bladder was noted to be 

intact and without injury.

 

ATTENDING SURGEON:

Dr. Ramona Rg

 

ASSISTANTS:

Crissy Vleásquez, Monica Guerrero, Zulema Ramirez, Selma Light.

 

SPECIMENS REMOVED:

Placenta.

 

ESTIMATED BLOOD LOSS:

800 mL.

 

URINE OUTPUT:

100 mL.  Of note, the urine was blood-tinged prior to arrival in the 

operating room.

 

INDICATIONS:

The patient is a 22-year-old  2, para 1-0-0-1 who presented 

with gestational hypertension at 39 weeks.  She underwent induction of

labor, but upon entering active labor, the fetus was unable to 

tolerate labor with repetitive severe variable decelerations.

 

DETAILS OF PROCEDURE:

After obtaining informed consent with risks, benefits and alternatives

discussed at length including, but not limited to pain, infection, 

bleeding, injury to other organs like the bladder, bowel, nerves and 

vessels, injury to the baby, need for a blood transfusion, need for 

repeat operation, need for hysterectomy, wound infection and 

breakdown, and other possible complications, the patient was taken to 

the operating room with her spinal redosed.  She had received 

terbutaline prior to being taken back to the operating room and with 

this, the variable decelerations resolved.  Upon arrival in the 

operating room, the fetus was monitored, with reassuring heart tones 

at this time.

 

After once again confirming adequate anesthesia, the patient was 

prepped and draped in normal sterile fashion.  A timeout procedure was

performed.  After once again confirming adequate anesthesia, a 

Pfannenstiel skin incision was made with the scalpel and carried down 

to the level of the hysterotomy.  The hysterotomy was nicked in the 

midline with the scalpel and extended laterally with curved Turner 

scissors.  The Kocher clamps were applied to the superior aspect of 

the fascial incision, which was dissected off the underlying rectus 

muscle bluntly and with the curved Turner scissors.  The Kocher clamps 

were applied to the inferior aspect of the fascial incision, which was

dissected off in a similar fashion.  The rectus muscles were 

in the midline and the peritoneum entered bluntly.  The peritoneal 

incision was extended bluntly.  The Sagar wound retractor was placed 

and the lower uterine segment visualized.  The lower uterine segment 

was thinned out with the scalpel and the hysterotomy created bluntly. 

The hysterotomy was extended bluntly.  The fetal vertex was elevated 

to the level of the hysterotomy and delivered atraumatically, followed

by atraumatic delivery of the remainder of the infant.  The infant was

vigorous on the field and the nose and mouth were suctioned with the 

bulb suction.  After a 45-second delay, the cord was doubly clamped 

and cut and the infant handed off to the waiting pediatric team.  A 

cord segment was obtained for cord pH and cord blood obtained for the 

nursery.  The placenta was removed manually, and the uterus cleared of

all clots and debris.  The hysterotomy was repaired with a #1 chromic 

in a running locked fashion.  A second layer of the same suture was 

used in imbricating fashion, after which excellent hemostasis was 

noted.

 

A bladder flap was unable to be created and the bladder was retrograde

instilled with 120 mL of sterile milk to confirm there was no injury 

to the bladder.  The bladder was noted to be intact and without 

injury.  The uterus was once again examined and noted to be 

hemostatic.  The gutters were cleared of all clots and debris.  The 

peritoneum was reapproximated with 2-0 Vicryl in a running fashion.  

Rectus muscles were examined and noted to be hemostatic.  The fascia 

was reapproximated with #1 Vicryl in a running fashion.  The 

subcutaneous tissue was irrigated with warm normal saline and noted to

be hemostatic.  The fascia was noted to be without defect.  The 

subcutaneous tissue was reapproximated with 2-0 Vicryl.  The skin 

edges were reapproximated with 3-0 Monocryl.  Excellent hemostasis and

cosmesis were noted.  Dermabond was placed.

 

The patient was taken to the PACU in stable condition.

 

All sponge, lap, and needle counts were correct x4.  I performed the 

entire procedure.

 

 

__________________________________

MD BOGDAN Martel/NEELA

D: 2018, 10:43 PM

T: 2018, 12:13 AM

Visit #: [id number]

Job #: [id number]

## 2018-03-21 NOTE — HHI.OB
Subjective


Post Operative Day:  1


Remarks


Postoperative day # 1. AFVSS overnight. Pain well-controlled. Incision clean, 

dry, and intact, not draining. Lochia less than a period.  Denies dysuria but 

has not been able to urinate. Straight-cath was performed at 3:30am this 

morning with return of 900ml of urine. She feels the urge to urinate but 

nothing comes out. No breast tenderness. She is feeding the baby via bottle. 

Baby in the NICU. Appetite good. No nausea or vomiting. Positive flatus. 

Negative bowel movement.  Ambulating well. Denies fever, chills, cough, 

shortness of breath, chest pain, and calf pain. Otherwise, she is doing well 

this morning and has no other complaints.





Objective


Vitals/I&O





Vital Signs








  Date Time  Temp Pulse Resp B/P (MAP) Pulse Ox O2 Delivery O2 Flow Rate FiO2


 


3/21/18 03:57 98.3 74 18 105/53 (70) 96   


 


3/21/18 00:00 98.2 111 20 143/89 (107) 98   


 


3/20/18 20:00 98.2 81 18  96   


 


3/20/18 20:00    135/75 (95)    


 


3/20/18 12:18 98.7 116 18 120/72 (88) 100   


 


3/20/18 12:04  117 18 121/73 (89) 100   


 


3/20/18 11:46    110/73 (85)    


 


3/20/18 11:46  117 17  100   


 


3/20/18 11:33 98.5 121 18 127/79 (95) 100   


 


3/20/18 10:00  123  114/65 (81)    


 


3/20/18 09:30  125  115/50 (71)    


 


3/20/18 09:10 99.4       


 


3/20/18 09:01   18     


 


3/20/18 09:00  113  126/73 (90)    


 


3/20/18 08:30  116  126/70 (88)    


 


3/20/18 08:01   18     


 


3/20/18 08:00  104  104/56 (72)    


 


3/20/18 07:30  103  111/61 (78)    








Result Diagram:  


3/21/18 0500                                                                   

             3/19/18 1650





Objective Remarks


GENERAL: Well-nourished, well-developed patient.


CARDIOVASCULAR: Regular rate and rhythm without murmurs, gallops, or rubs. 


RESPIRATORY: Breath sounds equal bilaterally. No accessory muscle use.


ABDOMEN/GI: Abdomen soft, non-tender, bowel sounds present. 


   Incision: Clean, dry and intact.


   Fundus: Firm, non-tender at umbilicus.


GENITOURINARY: Light to moderate bleeding.


EXTREMITIES: No cyanosis or edema, non-tender, without signs of DVT.


Medications and IVs





Current Medications








 Medications


  (Trade)  Dose


 Ordered  Sig/Ragini


 Route  Start Time


 Stop Time Status Last Admin


 


 Cefazolin Sodium/


 Dextrose  50 ml @ 


 100 mls/hr  ON  CALL


 IV  3/20/18 11:15


 3/24/18 11:14   


 


 


 Lactated Ringer's  1,000 ml @ 


 100 mls/hr  Q10H


 IV  3/20/18 18:44


 3/21/18 14:43  3/20/18 17:09


 


 


 Oxytocin  500 ml @ 


 100 mls/hr  UNSCH X1  PRN


 IV  3/20/18 23:45


 3/21/18 23:44   


 


 


  (NS Flush)  2 ml  BID


 IV FLUSH  3/20/18 21:00


     


 


 


  (NS Flush)  2 ml  UNSCH  PRN


 IV FLUSH  3/20/18 13:45


     


 


 


  (Mylicon Chew)  80 mg  QID  PRN


 PO  3/20/18 13:45


     


 


 


  (Motrin)  600 mg  Q6H  PRN


 PO  3/20/18 13:45


    3/21/18 02:55


 


 


  (Percocet  5-325


 Mg)  1 tab  Q4H  PRN


 PO  3/20/18 13:45


     


 


 


  (Percocet  5-325


 Mg)  2 tab  Q4H  PRN


 PO  3/20/18 13:45


     


 


 


  (Pepper-Colace)  2 tab  Q12H  PRN


 PO  3/20/18 13:45


     


 


 


  (M-M-R Ii Inj)  0.5 ml  ONCE ONCE


 SQ  3/21/18 16:00


 3/21/18 16:01   


 


 


  (Boostrix Inj)  0.5 ml  ONCE ONCE


 IM  3/21/18 16:00


 3/21/18 16:01  3/21/18 06:28


 


 


  (Zofran Inj)  4 mg  Q6H  PRN


 IV PUSH  3/20/18 13:45


     


 


 


 Miscellaneous


 Information  NO SYSTEMIC


 NARCOTICS TO BE


 GIVEN FO...  UNSCH  PRN


 .XX  3/20/18 10:47


 3/21/18 10:46   


 


 


  (Narcan Inj)  0.4 mg  UNSCH  PRN


 IV PUSH  3/20/18 10:47


 3/21/18 10:46   


 


 


  (Benadryl Inj)  25 mg  Q6H  PRN


 IV PUSH  3/20/18 10:47


 3/21/18 10:46   


 


 


  (Benadryl)  50 mg  Q6H  PRN


 PO  3/20/18 10:47


 3/21/18 10:46   


 


 


 Miscellaneous


 Information  ALL


 NURSING


 DEPARTMENTS  UNSCH  PRN


 .XX  3/20/18 10:47


 3/21/18 10:46   


 











Assessment/Plan


Problem List:  


(1) Acute urinary retention


ICD Codes:  R33.8 - Other retention of urine


(2)  delivery, delivered, current hospitalization


ICD Codes:  O82 - Encounter for  delivery without indication


Assessment and Plan


21 y/o female who is POD# 1 s/p CS for fetal distress 


-Continue routine postpartum care


-Percocet and Motrin PRN pain


-Pericolase PRN for constipation


-Encouraged OOB. Advised pelvic rest for 6 wks


-Will need a follow-up appointment within 1 week for incision check


-Re: birth ctrl - still deciding





Acute Urinary retention


-Straight cath x2 


-Bladder training 





Discussed with Rachel Miramontes MD Mar 21, 2018 07:16

## 2018-03-22 VITALS
RESPIRATION RATE: 17 BRPM | TEMPERATURE: 98.3 F | HEART RATE: 75 BPM | SYSTOLIC BLOOD PRESSURE: 128 MMHG | DIASTOLIC BLOOD PRESSURE: 82 MMHG

## 2018-03-22 VITALS
DIASTOLIC BLOOD PRESSURE: 81 MMHG | RESPIRATION RATE: 18 BRPM | SYSTOLIC BLOOD PRESSURE: 120 MMHG | HEART RATE: 86 BPM | TEMPERATURE: 98.1 F

## 2018-03-22 RX ADMIN — IBUPROFEN PRN MG: 600 TABLET, FILM COATED ORAL at 10:38

## 2018-03-22 RX ADMIN — OXYCODONE HYDROCHLORIDE AND ACETAMINOPHEN PRN TAB: 5; 325 TABLET ORAL at 21:04

## 2018-03-22 RX ADMIN — IBUPROFEN PRN MG: 600 TABLET, FILM COATED ORAL at 18:52

## 2018-03-22 NOTE — HHI.OB
Subjective


Post Operative Day:  2


Remarks


Postoperative day # 2. AFVSS overnight. Pain well-controlled. Incision clean, 

dry, and intact, not draining. Lochia less than a period.  Scott in place, 

uncomfortable.. She is feeding the baby via bottle. Baby in the nursery. 

Appetite good. No nausea or vomiting. Positive flatus. Negative bowel movement.

  Ambulating well. Denies fever, chills, cough, shortness of breath, chest pain

, and calf pain. Otherwise, she is doing well this morning and has no other 

complaints.





Objective


Vitals/I&O





Vital Signs








  Date Time  Temp Pulse Resp B/P (MAP) Pulse Ox O2 Delivery O2 Flow Rate FiO2


 


3/21/18 21:43 98.0 96 20 129/78 (95)    


 


3/21/18 11:55     99   


 


3/21/18 11:54 98.0 86 18 111/64 (80) 98   


 


3/21/18 08:00 98.0 77 16 100/58 (72) 100   








Result Diagram:  


3/21/18 0500                                                                   

             3/19/18 1650





Objective Remarks


GENERAL: Well-nourished, well-developed patient.


CARDIOVASCULAR: Regular rate and rhythm without murmurs, gallops, or rubs. 


RESPIRATORY: Breath sounds equal bilaterally. No accessory muscle use.


ABDOMEN/GI: Abdomen soft, non-tender, bowel sounds present. 


   Incision: Clean, dry and intact.


   Fundus: Firm, non-tender at umbilicus.


GENITOURINARY: Light to moderate bleeding. Scott in place


EXTREMITIES: No cyanosis or edema, non-tender, without signs of DVT.


Medications and IVs





Current Medications








 Medications


  (Trade)  Dose


 Ordered  Sig/Ragini


 Route  Start Time


 Stop Time Status Last Admin


 


 Cefazolin Sodium/


 Dextrose  50 ml @ 


 100 mls/hr  ON  CALL


 IV  3/20/18 11:15


 3/24/18 11:14   


 


 


  (NS Flush)  2 ml  BID


 IV FLUSH  3/20/18 21:00


     


 


 


  (NS Flush)  2 ml  UNSCH  PRN


 IV FLUSH  3/20/18 13:45


     


 


 


  (Mylicon Chew)  80 mg  QID  PRN


 PO  3/20/18 13:45


     


 


 


  (Motrin)  600 mg  Q6H  PRN


 PO  3/20/18 13:45


    3/21/18 23:04


 


 


  (Percocet  5-325


 Mg)  1 tab  Q4H  PRN


 PO  3/20/18 13:45


    3/21/18 23:04


 


 


  (Percocet  5-325


 Mg)  2 tab  Q4H  PRN


 PO  3/20/18 13:45


     


 


 


  (Pepper-Colace)  2 tab  Q12H  PRN


 PO  3/20/18 13:45


     


 


 


  (Zofran Inj)  4 mg  Q6H  PRN


 IV PUSH  3/20/18 13:45


     


 











Assessment/Plan


Problem List:  


(1) Acute urinary retention


ICD Codes:  R33.8 - Other retention of urine


(2)  delivery, delivered, current hospitalization


ICD Codes:  O82 - Encounter for  delivery without indication


Assessment and Plan


23 y/o female who is POD# 2 s/p CS for fetal distress 


-Continue routine postpartum care


-Percocet and Motrin PRN pain


-Pericolase PRN for constipation


-Encouraged OOB. Advised pelvic rest for 6 wks


-Will need a follow-up appointment within 1 week for incision check


-Re: birth ctrl - still deciding





Acute Urinary retention


-Scott in place to be removed today after 24 hours


-Bladder training 





Discussed with Rachel Bowens MD Mar 22, 2018 07:10

## 2018-03-23 VITALS
SYSTOLIC BLOOD PRESSURE: 138 MMHG | OXYGEN SATURATION: 99 % | TEMPERATURE: 98.4 F | DIASTOLIC BLOOD PRESSURE: 76 MMHG | RESPIRATION RATE: 20 BRPM | HEART RATE: 89 BPM

## 2018-03-23 RX ADMIN — IBUPROFEN PRN MG: 600 TABLET, FILM COATED ORAL at 10:19

## 2018-03-23 NOTE — HHI.DCPOC
Discharge Care Plan


Diagnosis:  


(1)  delivery, delivered, current hospitalization


(2) Acute urinary retention


Report Symptoms to Your Doctor


-Temperature above 100.5 degrees


-Redness, of incision or excessive or foul smelling drainage


-Unusual pain or calf pain


-Increased vaginal bleeding


-Painful or difficulty urinating


-Feelings of extreme sadness or anxiety after 2 weeks


Goals to Promote Your Health


* To prevent worsening of your condition and complications


* To maintain your health at the optimal level


Directions to Meet Your Goals


*** Take your medications as prescribed


*** Follow your dietary instruction


*** Follow activity as directed


*** Ensure plenty of rest for recovery


*** Drink fluids for hydration








*** Keep your appointments as scheduled


*** Take your immunizations and boosters as scheduled


*** If your symptoms worsen call your PCP, if no PCP go to Urgent Care Center 

or Emergency Room***


*** Smoking is Dangerous to Your Health. Avoid second hand smoke***


***Call the 24-hour crisis hotline for domestic abuse at 1-299.967.2774***











Rachel Victor MD Mar 23, 2018 08:35

## 2018-03-23 NOTE — HHI.OB
Subjective


Post Operative Day:  3


Remarks


Postoperative day # 3. AFVSS overnight. Pain well-controlled. Incision clean, 

dry, and intact, not draining. Lochia less than a period. She is urinating. She 

is feeding the baby via bottle. Baby in the nursery. Appetite good. No nausea 

or vomiting. Positive flatus. Negative bowel movement.  Ambulating well. Denies 

fever, chills, cough, shortness of breath, chest pain, and calf pain. Otherwise

, she is doing well this morning and has no other complaints.





Objective


Vitals/I&O





Vital Signs








  Date Time  Temp Pulse Resp B/P (MAP) Pulse Ox O2 Delivery O2 Flow Rate FiO2


 


3/22/18 20:54 98.3 75 17 128/82 (97)    


 


3/22/18 08:00 98.1 86 18 120/81 (94)    








Result Diagram:  


3/21/18 0500                                                                   

             3/19/18 1650





Objective Remarks


GENERAL: Well-nourished, well-developed patient.


CARDIOVASCULAR: Regular rate and rhythm without murmurs, gallops, or rubs. 


RESPIRATORY: Breath sounds equal bilaterally. No accessory muscle use.


ABDOMEN/GI: Abdomen soft, non-tender, bowel sounds present. 


   Incision: Clean, dry and intact.


   Fundus: Firm, non-tender below umbilicus.


GENITOURINARY: Light to moderate bleeding. 


EXTREMITIES: No cyanosis or edema, non-tender, without signs of DVT.


Medications and IVs





Current Medications








 Medications


  (Trade)  Dose


 Ordered  Sig/Ragini


 Route  Start Time


 Stop Time Status Last Admin


 


 Cefazolin Sodium/


 Dextrose  50 ml @ 


 100 mls/hr  ON  CALL


 IV  3/20/18 11:15


 3/24/18 11:14   


 


 


  (NS Flush)  2 ml  BID


 IV FLUSH  3/20/18 21:00


     


 


 


  (NS Flush)  2 ml  UNSCH  PRN


 IV FLUSH  3/20/18 13:45


     


 


 


  (Mylicon Chew)  80 mg  QID  PRN


 PO  3/20/18 13:45


     


 


 


  (Motrin)  600 mg  Q6H  PRN


 PO  3/20/18 13:45


    3/22/18 18:52


 


 


  (Percocet  5-325


 Mg)  1 tab  Q4H  PRN


 PO  3/20/18 13:45


    3/22/18 21:04


 


 


  (Percocet  5-325


 Mg)  2 tab  Q4H  PRN


 PO  3/20/18 13:45


     


 


 


  (Pepper-Colace)  2 tab  Q12H  PRN


 PO  3/20/18 13:45


    3/22/18 21:03


 


 


  (Zofran Inj)  4 mg  Q6H  PRN


 IV PUSH  3/20/18 13:45


     


 











Assessment/Plan


Problem List:  


(1) Acute urinary retention


ICD Codes:  R33.8 - Other retention of urine


Status:  Resolved


(2)  delivery, delivered, current hospitalization


ICD Codes:  O82 - Encounter for  delivery without indication


Status:  Acute


Assessment and Plan


23 y/o female who is POD# 3 s/p CS for fetal distress 


-Continue routine postpartum care


-Percocet and Motrin PRN pain


-Pericolase PRN for constipation


-Encouraged OOB. Advised pelvic rest for 6 wks


-Will need a follow-up appointment within 1 week for incision check


-Re: birth ctrl - still deciding





Acute Urinary retention


-Resolved


-Scott was removed yesterday after 24 hours


-Spontaneously voiding without issues








Discussed with Dr. Pearson


Discharge Planning


Dc home today











Rachel Victor MD Mar 23, 2018 07:27